# Patient Record
Sex: FEMALE | NOT HISPANIC OR LATINO | URBAN - METROPOLITAN AREA
[De-identification: names, ages, dates, MRNs, and addresses within clinical notes are randomized per-mention and may not be internally consistent; named-entity substitution may affect disease eponyms.]

---

## 2024-10-04 ENCOUNTER — NURSING HOME VISIT (OUTPATIENT)
Dept: POST ACUTE CARE | Facility: EXTERNAL LOCATION | Age: 79
End: 2024-10-04

## 2024-10-04 DIAGNOSIS — I82.4Y2 ACUTE DEEP VEIN THROMBOSIS (DVT) OF PROXIMAL VEIN OF LEFT LOWER EXTREMITY (MULTI): ICD-10-CM

## 2024-10-04 DIAGNOSIS — Z96.642 S/P HIP REPLACEMENT, LEFT: Primary | ICD-10-CM

## 2024-10-04 DIAGNOSIS — D50.9 IRON DEFICIENCY ANEMIA, UNSPECIFIED IRON DEFICIENCY ANEMIA TYPE: ICD-10-CM

## 2024-10-04 DIAGNOSIS — R26.2 DIFFICULTY IN WALKING: ICD-10-CM

## 2024-10-04 DIAGNOSIS — N18.31 STAGE 3A CHRONIC KIDNEY DISEASE (MULTI): ICD-10-CM

## 2024-10-04 NOTE — LETTER
Patient: Leigh Ann Gray  : 1945    Encounter Date: 10/04/2024    [facility]: [facility]  PROGRESS NOTE      Leigh Ann Gray is a 78 y.o. female that is being seen  today for admission for rehab after hospitalization.  .  Subjective  Patient is a 78-year-old female with history of iron deficiency anemia, osteoarthritis of the hip, DVT of the lower extremity, chronic kidney disease, chronic low back pain who is being seen for rehab at nursing facility after hospitalization.  Patient was admitted for surgery for left hip.  Patient had hip replacement done.  Postop patient had DVT in her leg.  Patient has been on anticoagulation.  Patient does have chronic anemia and received iron infusions before surgery.  Patient complains of some dark stools.  Patient will have guaiac stools done for 3 days.  Patient will also have lab work to be done.  Does complain of constipation.  Patient has been started on bowel regimen.  Patient is on MiraLAX as well as on stool softeners.      ROS  Negative for fever or chills  Negative for sore throat, ear pain, nasal discharge  Negative for cough, shortness of breath or wheezing  Negative for chest pain, palpitations, swelling of legs  Negative for abdominal pain, constipation, diarrhea, blood in the stools  Negative for urinary complaints  Negative for headache, dizziness, weakness or numbness  Positive for left hip pain and difficulty in walking.  Negative for depression or anxiety  All other systems reviewed and were negative   Vitals:    10/04/24 1316   BP: 110/70   Pulse: 78   Temp: 36.4 °C (97.6 °F)   SpO2: 96%        Physical Exam  Constitutional: Patient does not appear to be in any acute distress  Head and Face: NCAT  Eyes: Normal external exam, EOMI  ENT: Normal external inspection of ears and nose. Oropharynx normal.  Cardiovascular: RRR, S1/S2, no murmurs, rubs, or gallops, radial pulses +2, no edema of extremities  Pulmonary: CTAB, no respiratory  "distress.  Abdomen: +BS, soft, non-tender, nondistended, no guarding or rebound, no masses noted  MSK: Patient has decreased range of motion of the left hip due to recent surgery.  Mild edema of the left leg..  Skin- No lesions, contusions, or erythema.  Peripheral puslses palpable bilaterally 2+  Neuro: AAO X3, Cranial nerves 2-12 grossly intact,DTR 2+ in all 4 limbs   Psychiatric: Judgment intact. Appropriate mood and behavior    LABS   [unfilled]  Lab Results   Component Value Date    GLUCOSE 95 11/04/2020    CALCIUM 9.6 11/04/2020     11/04/2020    K 3.7 11/04/2020    CO2 20 (L) 11/04/2020     11/04/2020    BUN 20 11/04/2020    CREATININE 1.0 11/04/2020     No results found for: \"ALT\", \"AST\", \"GGT\", \"ALKPHOS\", \"BILITOT\"  Lab Results   Component Value Date    WBC 5.7 10/31/2020    HGB 13.3 10/31/2020    HCT 42.0 10/31/2020    MCV 85.7 10/31/2020     10/31/2020     Lab Results   Component Value Date    CHOL 268 (H) 10/31/2020     Lab Results   Component Value Date    HDL 36 (L) 10/31/2020     Lab Results   Component Value Date    LDLCALC 166 (H) 10/31/2020     Lab Results   Component Value Date    TRIG 331 (H) 10/31/2020     Lab Results   Component Value Date    HGBA1C 5.8 (H) 05/13/2024     Other labs not included in the list above were reviewed either before or during this encounter.    History   No past medical history on file.  Past Surgical History:   Procedure Laterality Date   • MR HEAD ANGIO WO IV CONTRAST  4/23/2015    MR HEAD ANGIO WO IV CONTRAST [facility]   • MR HEAD ANGIO WO IV CONTRAST  10/31/2020    MR HEAD ANGIO WO IV CONTRAST [facility]   • MR NECK ANGIO WO IV CONTRAST  10/31/2020    MR NECK ANGIO WO IV CONTRAST [facility]     No family history on file.  Not on File  No current outpatient medications on file prior to visit.     No current facility-administered medications on file prior to visit.       There is no immunization history on file for " this patient.  Patient's medical history was reviewed and updated either before or during this encounter.  ASSESSMENT / PLAN:  Diagnoses and all orders for this visit:  S/P hip replacement, left  Acute deep vein thrombosis (DVT) of proximal vein of left lower extremity (Multi)  Iron deficiency anemia, unspecified iron deficiency anemia type  Stage 3a chronic kidney disease (Multi)  Difficulty in walking    Patient is being seen for admission for rehab after hospitalization.  Patient had left hip surgery done and needs physical therapy.  Denies any significant pain.  Patient does have iron deficiency anemia.  Patient does complain of black stools.  Patient will have guaiac stools done.  Patient is also on Eliquis for DVT.  Will continue to monitor.    Aaron Pablo MD       Electronically Signed By: Aaron Pablo MD   10/11/24  1:18 PM

## 2024-10-11 ENCOUNTER — NURSING HOME VISIT (OUTPATIENT)
Dept: POST ACUTE CARE | Facility: EXTERNAL LOCATION | Age: 79
End: 2024-10-11
Payer: MEDICARE

## 2024-10-11 VITALS
HEART RATE: 78 BPM | TEMPERATURE: 97.6 F | SYSTOLIC BLOOD PRESSURE: 110 MMHG | OXYGEN SATURATION: 96 % | DIASTOLIC BLOOD PRESSURE: 70 MMHG

## 2024-10-11 DIAGNOSIS — Z98.890 STATUS POST HIP SURGERY: Primary | ICD-10-CM

## 2024-10-11 DIAGNOSIS — I82.4Y2 ACUTE DEEP VEIN THROMBOSIS (DVT) OF PROXIMAL VEIN OF LEFT LOWER EXTREMITY (MULTI): ICD-10-CM

## 2024-10-11 DIAGNOSIS — R26.2 DIFFICULTY IN WALKING: ICD-10-CM

## 2024-10-11 DIAGNOSIS — D50.9 IRON DEFICIENCY ANEMIA, UNSPECIFIED IRON DEFICIENCY ANEMIA TYPE: ICD-10-CM

## 2024-10-11 PROCEDURE — 99310 SBSQ NF CARE HIGH MDM 45: CPT | Performed by: INTERNAL MEDICINE

## 2024-10-11 NOTE — PROGRESS NOTES
[facility]: [facility]  PROGRESS NOTE      Leigh Ann Gray is a 78 y.o. female that is being seen  today for admission for rehab after hospitalization.  .  Subjective   Patient is a 78-year-old female with history of iron deficiency anemia, osteoarthritis of the hip, DVT of the lower extremity, chronic kidney disease, chronic low back pain who is being seen for rehab at nursing facility after hospitalization.  Patient was admitted for surgery for left hip.  Patient had hip replacement done.  Postop patient had DVT in her leg.  Patient has been on anticoagulation.  Patient does have chronic anemia and received iron infusions before surgery.  Patient complains of some dark stools.  Patient will have guaiac stools done for 3 days.  Patient will also have lab work to be done.  Does complain of constipation.  Patient has been started on bowel regimen.  Patient is on MiraLAX as well as on stool softeners.      ROS  Negative for fever or chills  Negative for sore throat, ear pain, nasal discharge  Negative for cough, shortness of breath or wheezing  Negative for chest pain, palpitations, swelling of legs  Negative for abdominal pain, constipation, diarrhea, blood in the stools  Negative for urinary complaints  Negative for headache, dizziness, weakness or numbness  Positive for left hip pain and difficulty in walking.  Negative for depression or anxiety  All other systems reviewed and were negative   Vitals:    10/04/24 1316   BP: 110/70   Pulse: 78   Temp: 36.4 °C (97.6 °F)   SpO2: 96%        Physical Exam  Constitutional: Patient does not appear to be in any acute distress  Head and Face: NCAT  Eyes: Normal external exam, EOMI  ENT: Normal external inspection of ears and nose. Oropharynx normal.  Cardiovascular: RRR, S1/S2, no murmurs, rubs, or gallops, radial pulses +2, no edema of extremities  Pulmonary: CTAB, no respiratory distress.  Abdomen: +BS, soft, non-tender, nondistended, no guarding or  "rebound, no masses noted  MSK: Patient has decreased range of motion of the left hip due to recent surgery.  Mild edema of the left leg..  Skin- No lesions, contusions, or erythema.  Peripheral puslses palpable bilaterally 2+  Neuro: AAO X3, Cranial nerves 2-12 grossly intact,DTR 2+ in all 4 limbs   Psychiatric: Judgment intact. Appropriate mood and behavior    LABS   [unfilled]  Lab Results   Component Value Date    GLUCOSE 95 11/04/2020    CALCIUM 9.6 11/04/2020     11/04/2020    K 3.7 11/04/2020    CO2 20 (L) 11/04/2020     11/04/2020    BUN 20 11/04/2020    CREATININE 1.0 11/04/2020     No results found for: \"ALT\", \"AST\", \"GGT\", \"ALKPHOS\", \"BILITOT\"  Lab Results   Component Value Date    WBC 5.7 10/31/2020    HGB 13.3 10/31/2020    HCT 42.0 10/31/2020    MCV 85.7 10/31/2020     10/31/2020     Lab Results   Component Value Date    CHOL 268 (H) 10/31/2020     Lab Results   Component Value Date    HDL 36 (L) 10/31/2020     Lab Results   Component Value Date    LDLCALC 166 (H) 10/31/2020     Lab Results   Component Value Date    TRIG 331 (H) 10/31/2020     Lab Results   Component Value Date    HGBA1C 5.8 (H) 05/13/2024     Other labs not included in the list above were reviewed either before or during this encounter.    History    No past medical history on file.  Past Surgical History:   Procedure Laterality Date    MR HEAD ANGIO WO IV CONTRAST  4/23/2015    MR HEAD ANGIO WO IV CONTRAST [facility]    MR HEAD ANGIO WO IV CONTRAST  10/31/2020    MR HEAD ANGIO WO IV CONTRAST [facility]    MR NECK ANGIO WO IV CONTRAST  10/31/2020    MR NECK ANGIO WO IV CONTRAST [facility]     No family history on file.  Not on File  No current outpatient medications on file prior to visit.     No current facility-administered medications on file prior to visit.       There is no immunization history on file for this patient.  Patient's medical history was reviewed and updated either " before or during this encounter.  ASSESSMENT / PLAN:  Diagnoses and all orders for this visit:  S/P hip replacement, left  Acute deep vein thrombosis (DVT) of proximal vein of left lower extremity (Multi)  Iron deficiency anemia, unspecified iron deficiency anemia type  Stage 3a chronic kidney disease (Multi)  Difficulty in walking    Patient is being seen for admission for rehab after hospitalization.  Patient had left hip surgery done and needs physical therapy.  Denies any significant pain.  Patient does have iron deficiency anemia.  Patient does complain of black stools.  Patient will have guaiac stools done.  Patient is also on Eliquis for DVT.  Will continue to monitor.    Aaron Pablo MD

## 2024-10-11 NOTE — LETTER
Patient: Leigh Ann Gray  : 1945    Encounter Date: 10/11/2024    [facility]: [facility]  PROGRESS NOTE      Leigh Ann Gray is a 78 y.o. female that is being seen  today for admission for rehab after hospitalization.  .  Subjective  Patient is a 78-year-old female with history of iron deficiency anemia, osteoarthritis of the hip, DVT of the lower extremity, chronic kidney disease, chronic low back pain who is being seen for foll w up at rehab.  Patient was admitted for surgery for left hip.  Patient had hip replacement done.  Postop patient had DVT in her leg.  Patient has been on anticoagulation.  Patient does have chronic anemia and received iron infusions before surgery.  Patient complains of some dark stools.  Patient stool occult was negative.  Patient has been started on iron supplementation and lab work is being done.  Patient is getting physical therapy.  Pain has been fairly controlled.      ROS  Negative for fever or chills  Negative for sore throat, ear pain, nasal discharge  Negative for cough, shortness of breath or wheezing  Negative for chest pain, palpitations, swelling of legs  Negative for abdominal pain, constipation, diarrhea, blood in the stools  Negative for urinary complaints  Negative for headache, dizziness, weakness or numbness  Positive for left hip pain and difficulty in walking.  Negative for depression or anxiety  All other systems reviewed and were negative   Vitals:    10/11/24 1354   BP: 124/72          Physical Exam  Constitutional: Patient does not appear to be in any acute distress  Head and Face: NCAT  Eyes: Normal external exam, EOMI  ENT: Normal external inspection of ears and nose. Oropharynx normal.  Cardiovascular: RRR, S1/S2, no murmurs, rubs, or gallops, radial pulses +2, no edema of extremities  Pulmonary: CTAB, no respiratory distress.  Abdomen: +BS, soft, non-tender, nondistended, no guarding or rebound, no masses noted  MSK: Patient has  "decreased range of motion of the left hip due to recent surgery.  Mild edema of the left leg..  Skin- No lesions, contusions, or erythema.  Peripheral puslses palpable bilaterally 2+  Neuro: AAO X3, Cranial nerves 2-12 grossly intact,DTR 2+ in all 4 limbs   Psychiatric: Judgment intact. Appropriate mood and behavior    LABS   [unfilled]  Lab Results   Component Value Date    GLUCOSE 95 11/04/2020    CALCIUM 9.6 11/04/2020     11/04/2020    K 3.7 11/04/2020    CO2 20 (L) 11/04/2020     11/04/2020    BUN 20 11/04/2020    CREATININE 1.0 11/04/2020     No results found for: \"ALT\", \"AST\", \"GGT\", \"ALKPHOS\", \"BILITOT\"  Lab Results   Component Value Date    WBC 5.7 10/31/2020    HGB 13.3 10/31/2020    HCT 42.0 10/31/2020    MCV 85.7 10/31/2020     10/31/2020     Lab Results   Component Value Date    CHOL 268 (H) 10/31/2020     Lab Results   Component Value Date    HDL 36 (L) 10/31/2020     Lab Results   Component Value Date    LDLCALC 166 (H) 10/31/2020     Lab Results   Component Value Date    TRIG 331 (H) 10/31/2020     Lab Results   Component Value Date    HGBA1C 5.8 (H) 05/13/2024     Other labs not included in the list above were reviewed either before or during this encounter.    History   No past medical history on file.  Past Surgical History:   Procedure Laterality Date   • MR HEAD ANGIO WO IV CONTRAST  4/23/2015    MR HEAD ANGIO WO IV CONTRAST [facility]   • MR HEAD ANGIO WO IV CONTRAST  10/31/2020    MR HEAD ANGIO WO IV CONTRAST [facility]   • MR NECK ANGIO WO IV CONTRAST  10/31/2020    MR NECK ANGIO WO IV CONTRAST [facility]     No family history on file.  Not on File  No current outpatient medications on file prior to visit.     No current facility-administered medications on file prior to visit.       There is no immunization history on file for this patient.  Patient's medical history was reviewed and updated either before or during this encounter.  ASSESSMENT " / PLAN:  Diagnoses and all orders for this visit:  Status post hip surgery  Difficulty in walking  Iron deficiency anemia, unspecified iron deficiency anemia type  Acute deep vein thrombosis (DVT) of proximal vein of left lower extremity (Multi)      Patient is being seen for follow up at rehab .  Patient had left hip surgery done and needs physical therapy.  Denies any significant pain.  Patient does have iron deficiency anemia.  Patient complained of black stools.   guaiac stools negative .  Patient is also on Eliquis for DVT.  Will continue to monitor.    Aaron Pablo MD       Electronically Signed By: Aaron Pablo MD   11/18/24  1:55 PM

## 2024-10-18 ENCOUNTER — NURSING HOME VISIT (OUTPATIENT)
Dept: POST ACUTE CARE | Facility: EXTERNAL LOCATION | Age: 79
End: 2024-10-18
Payer: MEDICARE

## 2024-10-18 DIAGNOSIS — Z98.890 STATUS POST HIP SURGERY: Primary | ICD-10-CM

## 2024-10-18 DIAGNOSIS — R26.2 DIFFICULTY IN WALKING: ICD-10-CM

## 2024-10-18 DIAGNOSIS — D50.9 IRON DEFICIENCY ANEMIA, UNSPECIFIED IRON DEFICIENCY ANEMIA TYPE: ICD-10-CM

## 2024-10-18 DIAGNOSIS — I82.4Y2 ACUTE DEEP VEIN THROMBOSIS (DVT) OF PROXIMAL VEIN OF LEFT LOWER EXTREMITY (MULTI): ICD-10-CM

## 2024-10-18 PROCEDURE — 99309 SBSQ NF CARE MODERATE MDM 30: CPT | Performed by: INTERNAL MEDICINE

## 2024-10-18 NOTE — LETTER
Patient: Leigh Ann Gray  : 1945    Encounter Date: 10/18/2024    [facility]: [facility]  PROGRESS NOTE      Leigh Ann Gray is a 78 y.o. female that is being seen  today for admission for rehab after hospitalization.  .  Subjective  Patient is a 78-year-old female with history of iron deficiency anemia, osteoarthritis of the hip, DVT of the lower extremity, chronic kidney disease, chronic low back pain who is being seen for foll w up at rehab.  Patient was admitted for surgery for left hip.  Patient had hip replacement done.  Postop patient had DVT in her leg.  Patient has been on anticoagulation.  Patient does have chronic anemia and received iron infusions before surgery.   Patient has been on iron supplementation and lab work is being done.  Anemia is stable.Patient is getting physical therapy.  Pain has been fairly controlled.      ROS  Negative for fever or chills  Negative for sore throat, ear pain, nasal discharge  Negative for cough, shortness of breath or wheezing  Negative for chest pain, palpitations, swelling of legs  Negative for abdominal pain, constipation, diarrhea, blood in the stools  Negative for urinary complaints  Negative for headache, dizziness, weakness or numbness  Positive for left hip pain and difficulty in walking.  Negative for depression or anxiety  All other systems reviewed and were negative   Vitals:    10/18/24 1356   BP: 110/74          Physical Exam  Constitutional: Patient does not appear to be in any acute distress  Head and Face: NCAT  Eyes: Normal external exam, EOMI  ENT: Normal external inspection of ears and nose. Oropharynx normal.  Cardiovascular: RRR, S1/S2, no murmurs, rubs, or gallops, radial pulses +2, no edema of extremities  Pulmonary: CTAB, no respiratory distress.  Abdomen: +BS, soft, non-tender, nondistended, no guarding or rebound, no masses noted  MSK: Patient has decreased range of motion of the left hip due to recent surgery.  Mild  "edema of the left leg..  Skin- No lesions, contusions, or erythema.  Peripheral puslses palpable bilaterally 2+  Neuro: AAO X3, Cranial nerves 2-12 grossly intact,DTR 2+ in all 4 limbs   Psychiatric: Judgment intact. Appropriate mood and behavior    LABS   [unfilled]  Lab Results   Component Value Date    GLUCOSE 95 11/04/2020    CALCIUM 9.6 11/04/2020     11/04/2020    K 3.7 11/04/2020    CO2 20 (L) 11/04/2020     11/04/2020    BUN 20 11/04/2020    CREATININE 1.0 11/04/2020     No results found for: \"ALT\", \"AST\", \"GGT\", \"ALKPHOS\", \"BILITOT\"  Lab Results   Component Value Date    WBC 5.7 10/31/2020    HGB 13.3 10/31/2020    HCT 42.0 10/31/2020    MCV 85.7 10/31/2020     10/31/2020     Lab Results   Component Value Date    CHOL 268 (H) 10/31/2020     Lab Results   Component Value Date    HDL 36 (L) 10/31/2020     Lab Results   Component Value Date    LDLCALC 166 (H) 10/31/2020     Lab Results   Component Value Date    TRIG 331 (H) 10/31/2020     Lab Results   Component Value Date    HGBA1C 5.8 (H) 05/13/2024     Other labs not included in the list above were reviewed either before or during this encounter.    History   No past medical history on file.  Past Surgical History:   Procedure Laterality Date   • MR HEAD ANGIO WO IV CONTRAST  4/23/2015    MR HEAD ANGIO WO IV CONTRAST [facility]   • MR HEAD ANGIO WO IV CONTRAST  10/31/2020    MR HEAD ANGIO WO IV CONTRAST [facility]   • MR NECK ANGIO WO IV CONTRAST  10/31/2020    MR NECK ANGIO WO IV CONTRAST [facility]     No family history on file.  Not on File  No current outpatient medications on file prior to visit.     No current facility-administered medications on file prior to visit.       There is no immunization history on file for this patient.  Patient's medical history was reviewed and updated either before or during this encounter.  ASSESSMENT / PLAN:  Diagnoses and all orders for this visit:  Status post hip " surgery  Acute deep vein thrombosis (DVT) of proximal vein of left lower extremity (Multi)  Iron deficiency anemia, unspecified iron deficiency anemia type  Difficulty in walking        Patient is being seen for follow up at rehab .  Patient had left hip surgery done and needs physical therapy.  Denies any significant pain.  Patient does have iron deficiency anemia.  Patient is on iron supplementation and anemia is improving.  Patient is also on Eliquis for DVT.  Will continue to monitor.    Aaron Pablo MD       Electronically Signed By: Aaron Pablo MD   11/18/24  1:58 PM

## 2024-10-25 ENCOUNTER — NURSING HOME VISIT (OUTPATIENT)
Dept: POST ACUTE CARE | Facility: EXTERNAL LOCATION | Age: 79
End: 2024-10-25
Payer: MEDICARE

## 2024-10-25 DIAGNOSIS — I82.4Y2 ACUTE DEEP VEIN THROMBOSIS (DVT) OF PROXIMAL VEIN OF LEFT LOWER EXTREMITY (MULTI): ICD-10-CM

## 2024-10-25 DIAGNOSIS — Z98.890 STATUS POST HIP SURGERY: Primary | ICD-10-CM

## 2024-10-25 DIAGNOSIS — R26.2 DIFFICULTY IN WALKING: ICD-10-CM

## 2024-10-25 DIAGNOSIS — D50.9 IRON DEFICIENCY ANEMIA, UNSPECIFIED IRON DEFICIENCY ANEMIA TYPE: ICD-10-CM

## 2024-10-25 PROCEDURE — 99309 SBSQ NF CARE MODERATE MDM 30: CPT | Performed by: INTERNAL MEDICINE

## 2024-10-25 NOTE — LETTER
Patient: Leigh Ann Gray  : 1945    Encounter Date: 10/25/2024    [facility]: [facility]  PROGRESS NOTE      Leigh Ann Gray is a 78 y.o. female that is being seen  today for admission for rehab after hospitalization.  .  Subjective  Patient is a 78-year-old female with history of iron deficiency anemia, osteoarthritis of the hip, DVT of the lower extremity, chronic kidney disease, chronic low back pain who is being seen for foll w up at rehab.  Patient was admitted for surgery for left hip.  Patient had hip replacement done.  Postop patient had DVT in her leg.  Patient has been on anticoagulation.  Patient does have chronic anemia and received iron infusions before surgery.   Patient has been on iron supplementation and lab work is being done.  Anemia is stable.Patient is getting physical therapy.  Pain has been fairly controlled.      ROS  Negative for fever or chills  Negative for sore throat, ear pain, nasal discharge  Negative for cough, shortness of breath or wheezing  Negative for chest pain, palpitations, swelling of legs  Negative for abdominal pain, constipation, diarrhea, blood in the stools  Negative for urinary complaints  Negative for headache, dizziness, weakness or numbness  Positive for left hip pain and difficulty in walking.  Negative for depression or anxiety  All other systems reviewed and were negative   Vitals:    10/25/24 1359   BP: 124/72            Physical Exam  Constitutional: Patient does not appear to be in any acute distress  Head and Face: NCAT  Eyes: Normal external exam, EOMI  ENT: Normal external inspection of ears and nose. Oropharynx normal.  Cardiovascular: RRR, S1/S2, no murmurs, rubs, or gallops, radial pulses +2, no edema of extremities  Pulmonary: CTAB, no respiratory distress.  Abdomen: +BS, soft, non-tender, nondistended, no guarding or rebound, no masses noted  MSK: Patient has decreased range of motion of the left hip due to recent surgery.   "Mild edema of the left leg..  Skin- No lesions, contusions, or erythema.  Peripheral puslses palpable bilaterally 2+  Neuro: AAO X3, Cranial nerves 2-12 grossly intact,DTR 2+ in all 4 limbs   Psychiatric: Judgment intact. Appropriate mood and behavior    LABS   [unfilled]  Lab Results   Component Value Date    GLUCOSE 95 11/04/2020    CALCIUM 9.6 11/04/2020     11/04/2020    K 3.7 11/04/2020    CO2 20 (L) 11/04/2020     11/04/2020    BUN 20 11/04/2020    CREATININE 1.0 11/04/2020     No results found for: \"ALT\", \"AST\", \"GGT\", \"ALKPHOS\", \"BILITOT\"  Lab Results   Component Value Date    WBC 5.7 10/31/2020    HGB 13.3 10/31/2020    HCT 42.0 10/31/2020    MCV 85.7 10/31/2020     10/31/2020     Lab Results   Component Value Date    CHOL 268 (H) 10/31/2020     Lab Results   Component Value Date    HDL 36 (L) 10/31/2020     Lab Results   Component Value Date    LDLCALC 166 (H) 10/31/2020     Lab Results   Component Value Date    TRIG 331 (H) 10/31/2020     Lab Results   Component Value Date    HGBA1C 5.8 (H) 05/13/2024     Other labs not included in the list above were reviewed either before or during this encounter.    History   No past medical history on file.  Past Surgical History:   Procedure Laterality Date   • MR HEAD ANGIO WO IV CONTRAST  4/23/2015    MR HEAD ANGIO WO IV CONTRAST [facility]PATIENT [facility]   • MR HEAD ANGIO WO IV CONTRAST  10/31/2020    MR HEAD ANGIO WO IV CONTRAST [facility]GENCY [facility]GAC[facility]   • MR NECK ANGIO WO IV CONTRAST  10/31/2020    MR NECK ANGIO WO IV CONTRAST [facility]     No family history on file.  Not on File  No current outpatient medications on file prior to visit.     No current facility-administered medications on file prior to visit.       There is no immunization history on file for this patient.  Patient's medical history was reviewed and updated either before or during this encounter.  ASSESSMENT / PLAN:  Diagnoses and all orders for this visit:  Status post hip " surgery  Iron deficiency anemia, unspecified iron deficiency anemia type  Acute deep vein thrombosis (DVT) of proximal vein of left lower extremity (Multi)  Difficulty in walking          Patient is being seen for follow up at rehab .  Patient had left hip surgery done and needs physical therapy.  Denies any significant pain.  Patient does have iron deficiency anemia.  Patient is on iron supplementation and anemia is improving.  Patient is also on Eliquis for DVT.  Will continue to monitor.    Aaron Pablo MD       Electronically Signed By: Aaron Pablo MD   11/18/24  2:00 PM

## 2024-11-01 ENCOUNTER — NURSING HOME VISIT (OUTPATIENT)
Dept: POST ACUTE CARE | Facility: EXTERNAL LOCATION | Age: 79
End: 2024-11-01
Payer: MEDICARE

## 2024-11-01 DIAGNOSIS — D50.9 IRON DEFICIENCY ANEMIA, UNSPECIFIED IRON DEFICIENCY ANEMIA TYPE: ICD-10-CM

## 2024-11-01 DIAGNOSIS — Z98.890 STATUS POST HIP SURGERY: Primary | ICD-10-CM

## 2024-11-01 DIAGNOSIS — R26.2 DIFFICULTY IN WALKING: ICD-10-CM

## 2024-11-01 DIAGNOSIS — I82.4Y2 ACUTE DEEP VEIN THROMBOSIS (DVT) OF PROXIMAL VEIN OF LEFT LOWER EXTREMITY (MULTI): ICD-10-CM

## 2024-11-01 PROCEDURE — 99309 SBSQ NF CARE MODERATE MDM 30: CPT | Performed by: INTERNAL MEDICINE

## 2024-11-01 NOTE — LETTER
Patient: Leigh Ann Gray  : 1945    Encounter Date: 2024    [facility]: [facility]  PROGRESS NOTE      Leigh Ann Gray is a 78 y.o. female that is being seen  today for admission for rehab after hospitalization.  .  Subjective  Patient is a 78-year-old female with history of iron deficiency anemia, osteoarthritis of the hip, DVT of the lower extremity, chronic kidney disease, chronic low back pain who is being seen for foll w up at rehab.  Patient was admitted for surgery for left hip.  Patient had hip replacement done.  Postop patient had DVT in her leg.  Patient has been on anticoagulation.  Patient does have chronic anemia and received iron infusions before surgery.   Patient has been on iron supplementation and lab work is being done.  Anemia is stable.Patient is getting physical therapy.  Pain has been fairly controlled.      ROS  Negative for fever or chills  Negative for sore throat, ear pain, nasal discharge  Negative for cough, shortness of breath or wheezing  Negative for chest pain, palpitations, swelling of legs  Negative for abdominal pain, constipation, diarrhea, blood in the stools  Negative for urinary complaints  Negative for headache, dizziness, weakness or numbness  Positive for left hip pain and difficulty in walking.  Negative for depression or anxiety  All other systems reviewed and were negative   Vitals:    24 1401   BP: 134/72              Physical Exam  Constitutional: Patient does not appear to be in any acute distress  Head and Face: NCAT  Eyes: Normal external exam, EOMI  ENT: Normal external inspection of ears and nose. Oropharynx normal.  Cardiovascular: RRR, S1/S2, no murmurs, rubs, or gallops, radial pulses +2, no edema of extremities  Pulmonary: CTAB, no respiratory distress.  Abdomen: +BS, soft, non-tender, nondistended, no guarding or rebound, no masses noted  MSK: Patient has decreased range of motion of the left hip due to recent surgery.   "Mild edema of the left leg..  Skin- No lesions, contusions, or erythema.  Peripheral puslses palpable bilaterally 2+  Neuro: AAO X3, Cranial nerves 2-12 grossly intact,DTR 2+ in all 4 limbs   Psychiatric: Judgment intact. Appropriate mood and behavior    LABS   [unfilled]  Lab Results   Component Value Date    GLUCOSE 95 11/04/2020    CALCIUM 9.6 11/04/2020     11/04/2020    K 3.7 11/04/2020    CO2 20 (L) 11/04/2020     11/04/2020    BUN 20 11/04/2020    CREATININE 1.0 11/04/2020     No results found for: \"ALT\", \"AST\", \"GGT\", \"ALKPHOS\", \"BILITOT\"  Lab Results   Component Value Date    WBC 5.7 10/31/2020    HGB 13.3 10/31/2020    HCT 42.0 10/31/2020    MCV 85.7 10/31/2020     10/31/2020     Lab Results   Component Value Date    CHOL 268 (H) 10/31/2020     Lab Results   Component Value Date    HDL 36 (L) 10/31/2020     Lab Results   Component Value Date    LDLCALC 166 (H) 10/31/2020     Lab Results   Component Value Date    TRIG 331 (H) 10/31/2020     Lab Results   Component Value Date    HGBA1C 5.8 (H) 05/13/2024     Other labs not included in the list above were reviewed either before or during this encounter.    History   No past medical history on file.  Past Surgical History:   Procedure Laterality Date   • MR HEAD ANGIO WO IV CONTRAST  4/23/2015    MR HEAD ANGIO WO IV CONTRAST [facility] INPATIENT LEGACY   • MR HEAD ANGIO WO IV CONTRAST  10/31/2020    MR HEAD ANGIO WO IV CONTRAST [facility] EMERGENCY LEGACY   • MR NECK ANGIO WO IV CONTRAST  10/31/2020    MR NECK ANGIO WO IV CONTRAST [facility] EMERGENCY LEGACY     No family history on file.  Not on File  No current outpatient medications on file prior to visit.     No current facility-administered medications on file prior to visit.       There is no immunization history on file for this patient.  Patient's medical history was reviewed and updated either before or during this encounter.  ASSESSMENT / PLAN:  Diagnoses and all orders for this visit:  Status post hip " surgery  Acute deep vein thrombosis (DVT) of proximal vein of left lower extremity (Multi)  Difficulty in walking  Iron deficiency anemia, unspecified iron deficiency anemia type            Patient is being seen for follow up at rehab .  Patient had left hip surgery done and needs physical therapy.  Denies any significant pain.  Patient does have iron deficiency anemia.  Patient is on iron supplementation and anemia is improving.  Patient is also on Eliquis for DVT.  Will continue to monitor.    Aaron Pablo MD       Electronically Signed By: Aaron Pablo MD   11/18/24  2:02 PM

## 2024-11-08 ENCOUNTER — NURSING HOME VISIT (OUTPATIENT)
Dept: POST ACUTE CARE | Facility: EXTERNAL LOCATION | Age: 79
End: 2024-11-08
Payer: MEDICARE

## 2024-11-08 DIAGNOSIS — I82.4Y2 ACUTE DEEP VEIN THROMBOSIS (DVT) OF PROXIMAL VEIN OF LEFT LOWER EXTREMITY (MULTI): ICD-10-CM

## 2024-11-08 DIAGNOSIS — Z98.890 STATUS POST HIP SURGERY: Primary | ICD-10-CM

## 2024-11-08 DIAGNOSIS — D50.9 IRON DEFICIENCY ANEMIA, UNSPECIFIED IRON DEFICIENCY ANEMIA TYPE: ICD-10-CM

## 2024-11-08 DIAGNOSIS — R26.2 DIFFICULTY IN WALKING: ICD-10-CM

## 2024-11-08 PROCEDURE — 99309 SBSQ NF CARE MODERATE MDM 30: CPT | Performed by: INTERNAL MEDICINE

## 2024-11-08 NOTE — LETTER
Patient: Leigh Ann Gray  : 1945    Encounter Date: 2024    [facility]: [facility]  PROGRESS NOTE      Leigh Ann Gray is a 78 y.o. female that is being seen  today for admission for rehab after hospitalization.  .  Subjective  Patient is a 78-year-old female with history of iron deficiency anemia, osteoarthritis of the hip, DVT of the lower extremity, chronic kidney disease, chronic low back pain who is being seen for foll w up at rehab.  Patient was admitted for surgery for left hip.  Patient had hip replacement done.  Postop patient had DVT in her leg.  Patient has been on anticoagulation.  Patient does have chronic anemia and received iron infusions before surgery.   Patient has been on iron supplementation and lab work is being done.  Anemia is stable.Patient is getting physical therapy.  Pain has been fairly controlled.      ROS  Negative for fever or chills  Negative for sore throat, ear pain, nasal discharge  Negative for cough, shortness of breath or wheezing  Negative for chest pain, palpitations, swelling of legs  Negative for abdominal pain, constipation, diarrhea, blood in the stools  Negative for urinary complaints  Negative for headache, dizziness, weakness or numbness  Positive for left hip pain and difficulty in walking.  Negative for depression or anxiety  All other systems reviewed and were negative   Vitals:    24 1402   BP: 124/68                Physical Exam  Constitutional: Patient does not appear to be in any acute distress  Head and Face: NCAT  Eyes: Normal external exam, EOMI  ENT: Normal external inspection of ears and nose. Oropharynx normal.  Cardiovascular: RRR, S1/S2, no murmurs, rubs, or gallops, radial pulses +2, no edema of extremities  Pulmonary: CTAB, no respiratory distress.  Abdomen: +BS, soft, non-tender, nondistended, no guarding or rebound, no masses noted  MSK: Patient has decreased range of motion of the left hip due to recent surgery.   "Mild edema of the left leg..  Skin- No lesions, contusions, or erythema.  Peripheral puslses palpable bilaterally 2+  Neuro: AAO X3, Cranial nerves 2-12 grossly intact,DTR 2+ in all 4 limbs   Psychiatric: Judgment intact. Appropriate mood and behavior    LABS   [unfilled]  Lab Results   Component Value Date    GLUCOSE 95 11/04/2020    CALCIUM 9.6 11/04/2020     11/04/2020    K 3.7 11/04/2020    CO2 20 (L) 11/04/2020     11/04/2020    BUN 20 11/04/2020    CREATININE 1.0 11/04/2020     No results found for: \"ALT\", \"AST\", \"GGT\", \"ALKPHOS\", \"BILITOT\"  Lab Results   Component Value Date    WBC 5.7 10/31/2020    HGB 13.3 10/31/2020    HCT 42.0 10/31/2020    MCV 85.7 10/31/2020     10/31/2020     Lab Results   Component Value Date    CHOL 268 (H) 10/31/2020     Lab Results   Component Value Date    HDL 36 (L) 10/31/2020     Lab Results   Component Value Date    LDLCALC 166 (H) 10/31/2020     Lab Results   Component Value Date    TRIG 331 (H) 10/31/2020     Lab Results   Component Value Date    HGBA1C 5.8 (H) 05/13/2024     Other labs not included in the list above were reviewed either before or during this encounter.    History   No past medical history on file.  Past Surgical History:   Procedure Laterality Date   • MR HEAD ANGIO WO IV CONTRAST  4/23/2015    MR HEAD ANGIO WO IV CONTRAST [facility] INPATIENT LEGACY   • MR HEAD ANGIO WO IV CONTRAST  10/31/2020    MR HEAD ANGIO WO IV CONTRAST [facility] EMERGENCY LEGACY   • MR NECK ANGIO WO IV CONTRAST  10/31/2020    MR NECK ANGIO WO IV CONTRAST [facility] EMERGENCY LEGACY     No family history on file.  Not on File  No current outpatient medications on file prior to visit.     No current facility-administered medications on file prior to visit.       There is no immunization history on file for this patient.  Patient's medical history was reviewed and updated either before or during this encounter.  ASSESSMENT / PLAN:  Diagnoses and all orders for this visit:  Status post hip " surgery  Acute deep vein thrombosis (DVT) of proximal vein of left lower extremity (Multi)  Iron deficiency anemia, unspecified iron deficiency anemia type  Difficulty in walking              Patient is being seen for follow up at rehab .  Patient had left hip surgery done and needs physical therapy.  Denies any significant pain.  Patient does have iron deficiency anemia.  Patient is on iron supplementation and anemia is improving.  Patient is also on Eliquis for DVT.  Will continue to monitor.    Aaron Pablo MD       Electronically Signed By: Aaron Pablo MD   11/18/24  2:03 PM

## 2024-11-15 ENCOUNTER — NURSING HOME VISIT (OUTPATIENT)
Dept: POST ACUTE CARE | Facility: EXTERNAL LOCATION | Age: 79
End: 2024-11-15
Payer: MEDICARE

## 2024-11-15 DIAGNOSIS — I82.4Y2 ACUTE DEEP VEIN THROMBOSIS (DVT) OF PROXIMAL VEIN OF LEFT LOWER EXTREMITY (MULTI): ICD-10-CM

## 2024-11-15 DIAGNOSIS — Z98.890 STATUS POST HIP SURGERY: Primary | ICD-10-CM

## 2024-11-15 DIAGNOSIS — R26.2 DIFFICULTY IN WALKING: ICD-10-CM

## 2024-11-15 DIAGNOSIS — D50.9 IRON DEFICIENCY ANEMIA, UNSPECIFIED IRON DEFICIENCY ANEMIA TYPE: ICD-10-CM

## 2024-11-15 PROCEDURE — 99309 SBSQ NF CARE MODERATE MDM 30: CPT | Performed by: INTERNAL MEDICINE

## 2024-11-15 NOTE — LETTER
Patient: Leigh Ann Gray  : 1945    Encounter Date: 11/15/2024    [facility]: [facility]  PROGRESS NOTE      Leigh Ann Gray is a 78 y.o. female that is being seen  today for admission for rehab after hospitalization.  .  Subjective  Patient is a 78-year-old female with history of iron deficiency anemia, osteoarthritis of the hip, DVT of the lower extremity, chronic kidney disease, chronic low back pain who is being seen for foll w up at rehab.  Patient was admitted for surgery for left hip.  Patient had hip replacement done.  Postop patient had DVT in her leg.  Patient has been on anticoagulation.  Patient does have chronic anemia and received iron infusions before surgery.   Patient has been on iron supplementation and lab work is being done.  Anemia is stable.Patient is getting physical therapy.  Pain has been fairly controlled.      ROS  Negative for fever or chills  Negative for sore throat, ear pain, nasal discharge  Negative for cough, shortness of breath or wheezing  Negative for chest pain, palpitations, swelling of legs  Negative for abdominal pain, constipation, diarrhea, blood in the stools  Negative for urinary complaints  Negative for headache, dizziness, weakness or numbness  Positive for left hip pain and difficulty in walking.  Negative for depression or anxiety  All other systems reviewed and were negative   There were no vitals filed for this visit.               Physical Exam  Constitutional: Patient does not appear to be in any acute distress  Head and Face: NCAT  Eyes: Normal external exam, EOMI  ENT: Normal external inspection of ears and nose. Oropharynx normal.  Cardiovascular: RRR, S1/S2, no murmurs, rubs, or gallops, radial pulses +2, no edema of extremities  Pulmonary: CTAB, no respiratory distress.  Abdomen: +BS, soft, non-tender, nondistended, no guarding or rebound, no masses noted  MSK: Patient has decreased range of motion of the left hip due to recent  "surgery.  Mild edema of the left leg..  Skin- No lesions, contusions, or erythema.  Peripheral puslses palpable bilaterally 2+  Neuro: AAO X3, Cranial nerves 2-12 grossly intact,DTR 2+ in all 4 limbs   Psychiatric: Judgment intact. Appropriate mood and behavior    LABS   [unfilled]  Lab Results   Component Value Date    GLUCOSE 95 11/04/2020    CALCIUM 9.6 11/04/2020     11/04/2020    K 3.7 11/04/2020    CO2 20 (L) 11/04/2020     11/04/2020    BUN 20 11/04/2020    CREATININE 1.0 11/04/2020     No results found for: \"ALT\", \"AST\", \"GGT\", \"ALKPHOS\", \"BILITOT\"  Lab Results   Component Value Date    WBC 5.7 10/31/2020    HGB 13.3 10/31/2020    HCT 42.0 10/31/2020    MCV 85.7 10/31/2020     10/31/2020     Lab Results   Component Value Date    CHOL 268 (H) 10/31/2020     Lab Results   Component Value Date    HDL 36 (L) 10/31/2020     Lab Results   Component Value Date    LDLCALC 166 (H) 10/31/2020     Lab Results   Component Value Date    TRIG 331 (H) 10/31/2020     Lab Results   Component Value Date    HGBA1C 5.8 (H) 05/13/2024     Other labs not included in the list above were reviewed either before or during this encounter.    History   No past medical history on file.  Past Surgical History:   Procedure Laterality Date   • MR HEAD ANGIO WO IV CONTRAST  4/23/2015    MR HEAD ANGIO WO IV CONTRAST [facility] INPATIENT LEGACY   • MR HEAD ANGIO WO IV CONTRAST  10/31/2020    MR HEAD ANGIO WO IV CONTRAST [facility] EMERGENCY LEGACY   • MR NECK ANGIO WO IV CONTRAST  10/31/2020    MR NECK ANGIO WO IV CONTRAST [facility] EMERGENCY LEGACY     No family history on file.  Not on File  No current outpatient medications on file prior to visit.     No current facility-administered medications on file prior to visit.       There is no immunization history on file for this patient.  Patient's medical history was reviewed and updated either before or during this encounter.  ASSESSMENT / PLAN:  Diagnoses and all orders for this " visit:  Status post hip surgery  Acute deep vein thrombosis (DVT) of proximal vein of left lower extremity (Multi)  Difficulty in walking  Iron deficiency anemia, unspecified iron deficiency anemia type                Patient is being seen for follow up at rehab .  Patient had left hip surgery done and needs physical therapy.  Denies any significant pain.  Patient does have iron deficiency anemia.  Patient is on iron supplementation and anemia is improving.  Patient is also on Eliquis for DVT.  Will continue to monitor.    Aaron Pablo MD       Electronically Signed By: Aaron Pablo MD   11/18/24  2:04 PM

## 2024-11-18 VITALS — DIASTOLIC BLOOD PRESSURE: 74 MMHG | SYSTOLIC BLOOD PRESSURE: 110 MMHG

## 2024-11-18 VITALS — DIASTOLIC BLOOD PRESSURE: 72 MMHG | SYSTOLIC BLOOD PRESSURE: 134 MMHG

## 2024-11-18 VITALS — DIASTOLIC BLOOD PRESSURE: 72 MMHG | SYSTOLIC BLOOD PRESSURE: 124 MMHG

## 2024-11-18 VITALS — SYSTOLIC BLOOD PRESSURE: 124 MMHG | DIASTOLIC BLOOD PRESSURE: 68 MMHG

## 2024-11-18 VITALS — SYSTOLIC BLOOD PRESSURE: 124 MMHG | DIASTOLIC BLOOD PRESSURE: 72 MMHG

## 2024-11-18 NOTE — PROGRESS NOTES
[facility]: [facility]  PROGRESS NOTE      Leigh Ann Gray is a 78 y.o. female that is being seen  today for admission for rehab after hospitalization.  .  Subjective   Patient is a 78-year-old female with history of iron deficiency anemia, osteoarthritis of the hip, DVT of the lower extremity, chronic kidney disease, chronic low back pain who is being seen for foll w up at rehab.  Patient was admitted for surgery for left hip.  Patient had hip replacement done.  Postop patient had DVT in her leg.  Patient has been on anticoagulation.  Patient does have chronic anemia and received iron infusions before surgery.   Patient has been on iron supplementation and lab work is being done.  Anemia is stable.Patient is getting physical therapy.  Pain has been fairly controlled.      ROS  Negative for fever or chills  Negative for sore throat, ear pain, nasal discharge  Negative for cough, shortness of breath or wheezing  Negative for chest pain, palpitations, swelling of legs  Negative for abdominal pain, constipation, diarrhea, blood in the stools  Negative for urinary complaints  Negative for headache, dizziness, weakness or numbness  Positive for left hip pain and difficulty in walking.  Negative for depression or anxiety  All other systems reviewed and were negative   Vitals:    10/18/24 1356   BP: 110/74          Physical Exam  Constitutional: Patient does not appear to be in any acute distress  Head and Face: NCAT  Eyes: Normal external exam, EOMI  ENT: Normal external inspection of ears and nose. Oropharynx normal.  Cardiovascular: RRR, S1/S2, no murmurs, rubs, or gallops, radial pulses +2, no edema of extremities  Pulmonary: CTAB, no respiratory distress.  Abdomen: +BS, soft, non-tender, nondistended, no guarding or rebound, no masses noted  MSK: Patient has decreased range of motion of the left hip due to recent surgery.  Mild edema of the left leg..  Skin- No lesions, contusions, or  "erythema.  Peripheral puslses palpable bilaterally 2+  Neuro: AAO X3, Cranial nerves 2-12 grossly intact,DTR 2+ in all 4 limbs   Psychiatric: Judgment intact. Appropriate mood and behavior    LABS   [unfilled]  Lab Results   Component Value Date    GLUCOSE 95 11/04/2020    CALCIUM 9.6 11/04/2020     11/04/2020    K 3.7 11/04/2020    CO2 20 (L) 11/04/2020     11/04/2020    BUN 20 11/04/2020    CREATININE 1.0 11/04/2020     No results found for: \"ALT\", \"AST\", \"GGT\", \"ALKPHOS\", \"BILITOT\"  Lab Results   Component Value Date    WBC 5.7 10/31/2020    HGB 13.3 10/31/2020    HCT 42.0 10/31/2020    MCV 85.7 10/31/2020     10/31/2020     Lab Results   Component Value Date    CHOL 268 (H) 10/31/2020     Lab Results   Component Value Date    HDL 36 (L) 10/31/2020     Lab Results   Component Value Date    LDLCALC 166 (H) 10/31/2020     Lab Results   Component Value Date    TRIG 331 (H) 10/31/2020     Lab Results   Component Value Date    HGBA1C 5.8 (H) 05/13/2024     Other labs not included in the list above were reviewed either before or during this encounter.    History    No past medical history on file.  Past Surgical History:   Procedure Laterality Date    MR HEAD ANGIO WO IV CONTRAST  4/23/2015    MR HEAD ANGIO WO IV CONTRAST [facility] INPATIENT LEGACY    MR HEAD ANGIO WO IV CONTRAST  10/31/2020    MR HEAD ANGIO WO IV CONTRAST [facility]    MR NECK ANGIO WO IV CONTRAST  10/31/2020    MR NECK ANGIO WO IV CONTRAST [facility] EMERGENCY LEGACY     No family history on file.  Not on File  No current outpatient medications on file prior to visit.     No current facility-administered medications on file prior to visit.       There is no immunization history on file for this patient.  Patient's medical history was reviewed and updated either before or during this encounter.  ASSESSMENT / PLAN:  Diagnoses and all orders for this visit:  Status post hip surgery  Acute deep vein thrombosis (DVT) of proximal vein of " left lower extremity (Multi)  Iron deficiency anemia, unspecified iron deficiency anemia type  Difficulty in walking        Patient is being seen for follow up at rehab .  Patient had left hip surgery done and needs physical therapy.  Denies any significant pain.  Patient does have iron deficiency anemia.  Patient is on iron supplementation and anemia is improving.  Patient is also on Eliquis for DVT.  Will continue to monitor.    Aaron Pablo MD

## 2024-11-18 NOTE — PROGRESS NOTES
[facility]: [facility]  PROGRESS NOTE      Leigh Ann Gray is a 78 y.o. female that is being seen  today for admission for rehab after hospitalization.  .  Subjective   Patient is a 78-year-old female with history of iron deficiency anemia, osteoarthritis of the hip, DVT of the lower extremity, chronic kidney disease, chronic low back pain who is being seen for foll w up at rehab.  Patient was admitted for surgery for left hip.  Patient had hip replacement done.  Postop patient had DVT in her leg.  Patient has been on anticoagulation.  Patient does have chronic anemia and received iron infusions before surgery.   Patient has been on iron supplementation and lab work is being done.  Anemia is stable.Patient is getting physical therapy.  Pain has been fairly controlled.      ROS  Negative for fever or chills  Negative for sore throat, ear pain, nasal discharge  Negative for cough, shortness of breath or wheezing  Negative for chest pain, palpitations, swelling of legs  Negative for abdominal pain, constipation, diarrhea, blood in the stools  Negative for urinary complaints  Negative for headache, dizziness, weakness or numbness  Positive for left hip pain and difficulty in walking.  Negative for depression or anxiety  All other systems reviewed and were negative   Vitals:    11/08/24 1402   BP: 124/68                Physical Exam  Constitutional: Patient does not appear to be in any acute distress  Head and Face: NCAT  Eyes: Normal external exam, EOMI  ENT: Normal external inspection of ears and nose. Oropharynx normal.  Cardiovascular: RRR, S1/S2, no murmurs, rubs, or gallops, radial pulses +2, no edema of extremities  Pulmonary: CTAB, no respiratory distress.  Abdomen: +BS, soft, non-tender, nondistended, no guarding or rebound, no masses noted  MSK: Patient has decreased range of motion of the left hip due to recent surgery.  Mild edema of the left leg..  Skin- No lesions, contusions, or  "erythema.  Peripheral puslses palpable bilaterally 2+  Neuro: AAO X3, Cranial nerves 2-12 grossly intact,DTR 2+ in all 4 limbs   Psychiatric: Judgment intact. Appropriate mood and behavior    LABS   [unfilled]  Lab Results   Component Value Date    GLUCOSE 95 11/04/2020    CALCIUM 9.6 11/04/2020     11/04/2020    K 3.7 11/04/2020    CO2 20 (L) 11/04/2020     11/04/2020    BUN 20 11/04/2020    CREATININE 1.0 11/04/2020     No results found for: \"ALT\", \"AST\", \"GGT\", \"ALKPHOS\", \"BILITOT\"  Lab Results   Component Value Date    WBC 5.7 10/31/2020    HGB 13.3 10/31/2020    HCT 42.0 10/31/2020    MCV 85.7 10/31/2020     10/31/2020     Lab Results   Component Value Date    CHOL 268 (H) 10/31/2020     Lab Results   Component Value Date    HDL 36 (L) 10/31/2020     Lab Results   Component Value Date    LDLCALC 166 (H) 10/31/2020     Lab Results   Component Value Date    TRIG 331 (H) 10/31/2020     Lab Results   Component Value Date    HGBA1C 5.8 (H) 05/13/2024     Other labs not included in the list above were reviewed either before or during this encounter.    History    No past medical history on file.  Past Surgical History:   Procedure Laterality Date    MR HEAD ANGIO WO IV CONTRAST  4/23/2015    MR HEAD ANGIO WO IV CONTRAST [facility] INPATIENT LEGACY    MR HEAD ANGIO WO IV CONTRAST  10/31/2020    MR HEAD ANGIO WO IV CONTRAST [facility]Y [facility]    MR NECK ANGIO WO IV CONTRAST  10/31/2020    MR NECK ANGIO WO IV CONTRAST [facility] EMERGENCY LEGACY     No family history on file.  Not on File  No current outpatient medications on file prior to visit.     No current facility-administered medications on file prior to visit.       There is no immunization history on file for this patient.  Patient's medical history was reviewed and updated either before or during this encounter.  ASSESSMENT / PLAN:  Diagnoses and all orders for this visit:  Status post hip surgery  Acute deep vein thrombosis (DVT) of proximal vein of " left lower extremity (Multi)  Iron deficiency anemia, unspecified iron deficiency anemia type  Difficulty in walking              Patient is being seen for follow up at rehab .  Patient had left hip surgery done and needs physical therapy.  Denies any significant pain.  Patient does have iron deficiency anemia.  Patient is on iron supplementation and anemia is improving.  Patient is also on Eliquis for DVT.  Will continue to monitor.    Aaron Pablo MD

## 2024-11-18 NOTE — PROGRESS NOTES
[facility]: [facility]  PROGRESS NOTE      Leigh Ann Gray is a 78 y.o. female that is being seen  today for admission for rehab after hospitalization.  .  Subjective   Patient is a 78-year-old female with history of iron deficiency anemia, osteoarthritis of the hip, DVT of the lower extremity, chronic kidney disease, chronic low back pain who is being seen for foll w up at rehab.  Patient was admitted for surgery for left hip.  Patient had hip replacement done.  Postop patient had DVT in her leg.  Patient has been on anticoagulation.  Patient does have chronic anemia and received iron infusions before surgery.   Patient has been on iron supplementation and lab work is being done.  Anemia is stable.Patient is getting physical therapy.  Pain has been fairly controlled.      ROS  Negative for fever or chills  Negative for sore throat, ear pain, nasal discharge  Negative for cough, shortness of breath or wheezing  Negative for chest pain, palpitations, swelling of legs  Negative for abdominal pain, constipation, diarrhea, blood in the stools  Negative for urinary complaints  Negative for headache, dizziness, weakness or numbness  Positive for left hip pain and difficulty in walking.  Negative for depression or anxiety  All other systems reviewed and were negative   There were no vitals filed for this visit.               Physical Exam  Constitutional: Patient does not appear to be in any acute distress  Head and Face: NCAT  Eyes: Normal external exam, EOMI  ENT: Normal external inspection of ears and nose. Oropharynx normal.  Cardiovascular: RRR, S1/S2, no murmurs, rubs, or gallops, radial pulses +2, no edema of extremities  Pulmonary: CTAB, no respiratory distress.  Abdomen: +BS, soft, non-tender, nondistended, no guarding or rebound, no masses noted  MSK: Patient has decreased range of motion of the left hip due to recent surgery.  Mild edema of the left leg..  Skin- No lesions, contusions, or  "erythema.  Peripheral puslses palpable bilaterally 2+  Neuro: AAO X3, Cranial nerves 2-12 grossly intact,DTR 2+ in all 4 limbs   Psychiatric: Judgment intact. Appropriate mood and behavior    LABS   [unfilled]  Lab Results   Component Value Date    GLUCOSE 95 11/04/2020    CALCIUM 9.6 11/04/2020     11/04/2020    K 3.7 11/04/2020    CO2 20 (L) 11/04/2020     11/04/2020    BUN 20 11/04/2020    CREATININE 1.0 11/04/2020     No results found for: \"ALT\", \"AST\", \"GGT\", \"ALKPHOS\", \"BILITOT\"  Lab Results   Component Value Date    WBC 5.7 10/31/2020    HGB 13.3 10/31/2020    HCT 42.0 10/31/2020    MCV 85.7 10/31/2020     10/31/2020     Lab Results   Component Value Date    CHOL 268 (H) 10/31/2020     Lab Results   Component Value Date    HDL 36 (L) 10/31/2020     Lab Results   Component Value Date    LDLCALC 166 (H) 10/31/2020     Lab Results   Component Value Date    TRIG 331 (H) 10/31/2020     Lab Results   Component Value Date    HGBA1C 5.8 (H) 05/13/2024     Other labs not included in the list above were reviewed either before or during this encounter.    History    No past medical history on file.  Past Surgical History:   Procedure Laterality Date    MR HEAD ANGIO WO IV CONTRAST  4/23/2015    MR HEAD ANGIO WO IV CONTRAST [facility] INPATIENT LEGACY    MR HEAD ANGIO WO IV CONTRAST  10/31/2020    MR HEAD ANGIO WO IV CONTRAST [facility]    MR NECK ANGIO WO IV CONTRAST  10/31/2020    MR NECK ANGIO WO IV CONTRAST [facility] EMERGENCY LEGACY     No family history on file.  Not on File  No current outpatient medications on file prior to visit.     No current facility-administered medications on file prior to visit.       There is no immunization history on file for this patient.  Patient's medical history was reviewed and updated either before or during this encounter.  ASSESSMENT / PLAN:  Diagnoses and all orders for this visit:  Status post hip surgery  Acute deep vein thrombosis (DVT) of proximal vein of " left lower extremity (Multi)  Difficulty in walking  Iron deficiency anemia, unspecified iron deficiency anemia type                Patient is being seen for follow up at rehab .  Patient had left hip surgery done and needs physical therapy.  Denies any significant pain.  Patient does have iron deficiency anemia.  Patient is on iron supplementation and anemia is improving.  Patient is also on Eliquis for DVT.  Will continue to monitor.    Aaron Pablo MD

## 2024-11-18 NOTE — PROGRESS NOTES
[facility]: [facility]  PROGRESS NOTE      Leigh Ann Gray is a 78 y.o. female that is being seen  today for admission for rehab after hospitalization.  .  Subjective   Patient is a 78-year-old female with history of iron deficiency anemia, osteoarthritis of the hip, DVT of the lower extremity, chronic kidney disease, chronic low back pain who is being seen for foll w up at rehab.  Patient was admitted for surgery for left hip.  Patient had hip replacement done.  Postop patient had DVT in her leg.  Patient has been on anticoagulation.  Patient does have chronic anemia and received iron infusions before surgery.  Patient complains of some dark stools.  Patient stool occult was negative.  Patient has been started on iron supplementation and lab work is being done.  Patient is getting physical therapy.  Pain has been fairly controlled.      ROS  Negative for fever or chills  Negative for sore throat, ear pain, nasal discharge  Negative for cough, shortness of breath or wheezing  Negative for chest pain, palpitations, swelling of legs  Negative for abdominal pain, constipation, diarrhea, blood in the stools  Negative for urinary complaints  Negative for headache, dizziness, weakness or numbness  Positive for left hip pain and difficulty in walking.  Negative for depression or anxiety  All other systems reviewed and were negative   Vitals:    10/11/24 1354   BP: 124/72          Physical Exam  Constitutional: Patient does not appear to be in any acute distress  Head and Face: NCAT  Eyes: Normal external exam, EOMI  ENT: Normal external inspection of ears and nose. Oropharynx normal.  Cardiovascular: RRR, S1/S2, no murmurs, rubs, or gallops, radial pulses +2, no edema of extremities  Pulmonary: CTAB, no respiratory distress.  Abdomen: +BS, soft, non-tender, nondistended, no guarding or rebound, no masses noted  MSK: Patient has decreased range of motion of the left hip due to recent surgery.  Mild  "edema of the left leg..  Skin- No lesions, contusions, or erythema.  Peripheral puslses palpable bilaterally 2+  Neuro: AAO X3, Cranial nerves 2-12 grossly intact,DTR 2+ in all 4 limbs   Psychiatric: Judgment intact. Appropriate mood and behavior    LABS   [unfilled]  Lab Results   Component Value Date    GLUCOSE 95 11/04/2020    CALCIUM 9.6 11/04/2020     11/04/2020    K 3.7 11/04/2020    CO2 20 (L) 11/04/2020     11/04/2020    BUN 20 11/04/2020    CREATININE 1.0 11/04/2020     No results found for: \"ALT\", \"AST\", \"GGT\", \"ALKPHOS\", \"BILITOT\"  Lab Results   Component Value Date    WBC 5.7 10/31/2020    HGB 13.3 10/31/2020    HCT 42.0 10/31/2020    MCV 85.7 10/31/2020     10/31/2020     Lab Results   Component Value Date    CHOL 268 (H) 10/31/2020     Lab Results   Component Value Date    HDL 36 (L) 10/31/2020     Lab Results   Component Value Date    LDLCALC 166 (H) 10/31/2020     Lab Results   Component Value Date    TRIG 331 (H) 10/31/2020     Lab Results   Component Value Date    HGBA1C 5.8 (H) 05/13/2024     Other labs not included in the list above were reviewed either before or during this encounter.    History    No past medical history on file.  Past Surgical History:   Procedure Laterality Date    MR HEAD ANGIO WO IV CONTRAST  4/23/2015    MR HEAD ANGIO WO IV CONTRAST [facility]    MR HEAD ANGIO WO IV CONTRAST  10/31/2020    MR HEAD ANGIO WO IV CONTRAST [facility]    MR NECK ANGIO WO IV CONTRAST  10/31/2020    MR NECK ANGIO WO IV CONTRAST [facility]     No family history on file.  Not on File  No current outpatient medications on file prior to visit.     No current facility-administered medications on file prior to visit.       There is no immunization history on file for this patient.  Patient's medical history was reviewed and updated either before or during this encounter.  ASSESSMENT / PLAN:  Diagnoses and all orders for this visit:  Status post hip " surgery  Difficulty in walking  Iron deficiency anemia, unspecified iron deficiency anemia type  Acute deep vein thrombosis (DVT) of proximal vein of left lower extremity (Multi)      Patient is being seen for follow up at rehab .  Patient had left hip surgery done and needs physical therapy.  Denies any significant pain.  Patient does have iron deficiency anemia.  Patient complained of black stools.   guaiac stools negative .  Patient is also on Eliquis for DVT.  Will continue to monitor.    Aaron Pablo MD

## 2024-11-18 NOTE — PROGRESS NOTES
[facility]: [facility]  PROGRESS NOTE      Leigh Ann Gray is a 78 y.o. female that is being seen  today for admission for rehab after hospitalization.  .  Subjective   Patient is a 78-year-old female with history of iron deficiency anemia, osteoarthritis of the hip, DVT of the lower extremity, chronic kidney disease, chronic low back pain who is being seen for foll w up at rehab.  Patient was admitted for surgery for left hip.  Patient had hip replacement done.  Postop patient had DVT in her leg.  Patient has been on anticoagulation.  Patient does have chronic anemia and received iron infusions before surgery.   Patient has been on iron supplementation and lab work is being done.  Anemia is stable.Patient is getting physical therapy.  Pain has been fairly controlled.      ROS  Negative for fever or chills  Negative for sore throat, ear pain, nasal discharge  Negative for cough, shortness of breath or wheezing  Negative for chest pain, palpitations, swelling of legs  Negative for abdominal pain, constipation, diarrhea, blood in the stools  Negative for urinary complaints  Negative for headache, dizziness, weakness or numbness  Positive for left hip pain and difficulty in walking.  Negative for depression or anxiety  All other systems reviewed and were negative   Vitals:    11/01/24 1401   BP: 134/72              Physical Exam  Constitutional: Patient does not appear to be in any acute distress  Head and Face: NCAT  Eyes: Normal external exam, EOMI  ENT: Normal external inspection of ears and nose. Oropharynx normal.  Cardiovascular: RRR, S1/S2, no murmurs, rubs, or gallops, radial pulses +2, no edema of extremities  Pulmonary: CTAB, no respiratory distress.  Abdomen: +BS, soft, non-tender, nondistended, no guarding or rebound, no masses noted  MSK: Patient has decreased range of motion of the left hip due to recent surgery.  Mild edema of the left leg..  Skin- No lesions, contusions, or  "erythema.  Peripheral puslses palpable bilaterally 2+  Neuro: AAO X3, Cranial nerves 2-12 grossly intact,DTR 2+ in all 4 limbs   Psychiatric: Judgment intact. Appropriate mood and behavior    LABS   [unfilled]  Lab Results   Component Value Date    GLUCOSE 95 11/04/2020    CALCIUM 9.6 11/04/2020     11/04/2020    K 3.7 11/04/2020    CO2 20 (L) 11/04/2020     11/04/2020    BUN 20 11/04/2020    CREATININE 1.0 11/04/2020     No results found for: \"ALT\", \"AST\", \"GGT\", \"ALKPHOS\", \"BILITOT\"  Lab Results   Component Value Date    WBC 5.7 10/31/2020    HGB 13.3 10/31/2020    HCT 42.0 10/31/2020    MCV 85.7 10/31/2020     10/31/2020     Lab Results   Component Value Date    CHOL 268 (H) 10/31/2020     Lab Results   Component Value Date    HDL 36 (L) 10/31/2020     Lab Results   Component Value Date    LDLCALC 166 (H) 10/31/2020     Lab Results   Component Value Date    TRIG 331 (H) 10/31/2020     Lab Results   Component Value Date    HGBA1C 5.8 (H) 05/13/2024     Other labs not included in the list above were reviewed either before or during this encounter.    History    No past medical history on file.  Past Surgical History:   Procedure Laterality Date    MR HEAD ANGIO WO IV CONTRAST  4/23/2015    MR HEAD ANGIO WO IV CONTRAST [facility] INPATIENT LEGACY    MR HEAD ANGIO WO IV CONTRAST  10/31/2020    MR HEAD ANGIO WO IV CONTRAST [facility]    MR NECK ANGIO WO IV CONTRAST  10/31/2020    MR NECK ANGIO WO IV CONTRAST [facility] EM[facility]GENCY LEGAC[facility]     No family history on file.  Not on File  No current outpatient medications on file prior to visit.     No current facility-administered medications on file prior to visit.       There is no immunization history on file for this patient.  Patient's medical history was reviewed and updated either before or during this encounter.  ASSESSMENT / PLAN:  Diagnoses and all orders for this visit:  Status post hip surgery  Acute deep vein thrombosis (DVT) of proximal vein of " left lower extremity (Multi)  Difficulty in walking  Iron deficiency anemia, unspecified iron deficiency anemia type            Patient is being seen for follow up at rehab .  Patient had left hip surgery done and needs physical therapy.  Denies any significant pain.  Patient does have iron deficiency anemia.  Patient is on iron supplementation and anemia is improving.  Patient is also on Eliquis for DVT.  Will continue to monitor.    Aaron Pablo MD

## 2024-11-18 NOTE — PROGRESS NOTES
[facility]: [facility]  PROGRESS NOTE      Leigh Ann Gray is a 78 y.o. female that is being seen  today for admission for rehab after hospitalization.  .  Subjective   Patient is a 78-year-old female with history of iron deficiency anemia, osteoarthritis of the hip, DVT of the lower extremity, chronic kidney disease, chronic low back pain who is being seen for foll w up at rehab.  Patient was admitted for surgery for left hip.  Patient had hip replacement done.  Postop patient had DVT in her leg.  Patient has been on anticoagulation.  Patient does have chronic anemia and received iron infusions before surgery.   Patient has been on iron supplementation and lab work is being done.  Anemia is stable.Patient is getting physical therapy.  Pain has been fairly controlled.      ROS  Negative for fever or chills  Negative for sore throat, ear pain, nasal discharge  Negative for cough, shortness of breath or wheezing  Negative for chest pain, palpitations, swelling of legs  Negative for abdominal pain, constipation, diarrhea, blood in the stools  Negative for urinary complaints  Negative for headache, dizziness, weakness or numbness  Positive for left hip pain and difficulty in walking.  Negative for depression or anxiety  All other systems reviewed and were negative   Vitals:    10/25/24 1359   BP: 124/72            Physical Exam  Constitutional: Patient does not appear to be in any acute distress  Head and Face: NCAT  Eyes: Normal external exam, EOMI  ENT: Normal external inspection of ears and nose. Oropharynx normal.  Cardiovascular: RRR, S1/S2, no murmurs, rubs, or gallops, radial pulses +2, no edema of extremities  Pulmonary: CTAB, no respiratory distress.  Abdomen: +BS, soft, non-tender, nondistended, no guarding or rebound, no masses noted  MSK: Patient has decreased range of motion of the left hip due to recent surgery.  Mild edema of the left leg..  Skin- No lesions, contusions, or  "erythema.  Peripheral puslses palpable bilaterally 2+  Neuro: AAO X3, Cranial nerves 2-12 grossly intact,DTR 2+ in all 4 limbs   Psychiatric: Judgment intact. Appropriate mood and behavior    LABS   [unfilled]  Lab Results   Component Value Date    GLUCOSE 95 11/04/2020    CALCIUM 9.6 11/04/2020     11/04/2020    K 3.7 11/04/2020    CO2 20 (L) 11/04/2020     11/04/2020    BUN 20 11/04/2020    CREATININE 1.0 11/04/2020     No results found for: \"ALT\", \"AST\", \"GGT\", \"ALKPHOS\", \"BILITOT\"  Lab Results   Component Value Date    WBC 5.7 10/31/2020    HGB 13.3 10/31/2020    HCT 42.0 10/31/2020    MCV 85.7 10/31/2020     10/31/2020     Lab Results   Component Value Date    CHOL 268 (H) 10/31/2020     Lab Results   Component Value Date    HDL 36 (L) 10/31/2020     Lab Results   Component Value Date    LDLCALC 166 (H) 10/31/2020     Lab Results   Component Value Date    TRIG 331 (H) 10/31/2020     Lab Results   Component Value Date    HGBA1C 5.8 (H) 05/13/2024     Other labs not included in the list above were reviewed either before or during this encounter.    History    No past medical history on file.  Past Surgical History:   Procedure Laterality Date    MR HEAD ANGIO WO IV CONTRAST  4/23/2015    MR HEAD ANGIO WO IV CONTRAST [facility] INPATIENT LEGACY    MR HEAD ANGIO WO IV CONTRAST  10/31/2020    MR HEAD ANGIO WO IV CONTRAST [facility]    MR NECK ANGIO WO IV CONTRAST  10/31/2020    MR NECK ANGIO WO IV CONTRAST [facility] EMERGENCY LEGACY     No family history on file.  Not on File  No current outpatient medications on file prior to visit.     No current facility-administered medications on file prior to visit.       There is no immunization history on file for this patient.  Patient's medical history was reviewed and updated either before or during this encounter.  ASSESSMENT / PLAN:  Diagnoses and all orders for this visit:  Status post hip surgery  Iron deficiency anemia, unspecified iron deficiency " anemia type  Acute deep vein thrombosis (DVT) of proximal vein of left lower extremity (Multi)  Difficulty in walking          Patient is being seen for follow up at rehab .  Patient had left hip surgery done and needs physical therapy.  Denies any significant pain.  Patient does have iron deficiency anemia.  Patient is on iron supplementation and anemia is improving.  Patient is also on Eliquis for DVT.  Will continue to monitor.    Aaron Pablo MD

## 2024-11-22 ENCOUNTER — NURSING HOME VISIT (OUTPATIENT)
Dept: POST ACUTE CARE | Facility: EXTERNAL LOCATION | Age: 79
End: 2024-11-22
Payer: MEDICARE

## 2024-11-22 DIAGNOSIS — Z98.890 STATUS POST HIP SURGERY: Primary | ICD-10-CM

## 2024-11-22 DIAGNOSIS — I82.4Y2 ACUTE DEEP VEIN THROMBOSIS (DVT) OF PROXIMAL VEIN OF LEFT LOWER EXTREMITY (MULTI): ICD-10-CM

## 2024-11-22 DIAGNOSIS — N18.31 STAGE 3A CHRONIC KIDNEY DISEASE (MULTI): ICD-10-CM

## 2024-11-22 DIAGNOSIS — R26.2 DIFFICULTY IN WALKING: ICD-10-CM

## 2024-11-22 DIAGNOSIS — D50.9 IRON DEFICIENCY ANEMIA, UNSPECIFIED IRON DEFICIENCY ANEMIA TYPE: ICD-10-CM

## 2024-11-22 DIAGNOSIS — Z96.642 S/P HIP REPLACEMENT, LEFT: ICD-10-CM

## 2024-11-22 PROCEDURE — 99309 SBSQ NF CARE MODERATE MDM 30: CPT | Performed by: INTERNAL MEDICINE

## 2024-11-22 NOTE — LETTER
Patient: Leigh Ann Gray  : 1945    Encounter Date: 2024    [facility]: [facility]  PROGRESS NOTE      Leigh Ann Gray is a 78 y.o. female that is being seen  today for follow up at  rehab   Subjective  Patient is a 78-year-old female with history of iron deficiency anemia, osteoarthritis of the hip, DVT of the lower extremity, chronic kidney disease, chronic low back pain who is being seen for follow up at rehab.  Patient was admitted for surgery for left hip.  Patient had hip replacement done.  Postop patient had DVT in her leg.  Patient has been on anticoagulation.  Patient does have chronic anemia and received iron infusions before surgery.   Patient has been on iron supplementation and lab work is being done.  Anemia is stable.Patient is getting physical therapy.  Pain has been fairly controlled.      ROS  Negative for fever or chills  Negative for sore throat, ear pain, nasal discharge  Negative for cough, shortness of breath or wheezing  Negative for chest pain, palpitations, swelling of legs  Negative for abdominal pain, constipation, diarrhea, blood in the stools  Negative for urinary complaints  Negative for headache, dizziness, weakness or numbness  Positive for left hip pain and difficulty in walking.  Negative for depression or anxiety  All other systems reviewed and were negative   Vitals:    24 1326   BP: 130/70   Pulse: 78   Temp: 37 °C (98.6 °F)                  Physical Exam  Constitutional: Patient does not appear to be in any acute distress  Head and Face: NCAT  Eyes: Normal external exam, EOMI  ENT: Normal external inspection of ears and nose. Oropharynx normal.  Cardiovascular: RRR, S1/S2, no murmurs, rubs, or gallops, radial pulses +2, no edema of extremities  Pulmonary: CTAB, no respiratory distress.  Abdomen: +BS, soft, non-tender, nondistended, no guarding or rebound, no masses noted  MSK: Patient has decreased range of motion of the left hip due to  "recent surgery.  Mild edema of the left leg..  Skin- No lesions, contusions, or erythema.  Peripheral puslses palpable bilaterally 2+  Neuro: AAO X3, Cranial nerves 2-12 grossly intact,DTR 2+ in all 4 limbs   Psychiatric: Judgment intact. Appropriate mood and behavior    LABS   [unfilled]  Lab Results   Component Value Date    GLUCOSE 95 11/04/2020    CALCIUM 9.6 11/04/2020     11/04/2020    K 3.7 11/04/2020    CO2 20 (L) 11/04/2020     11/04/2020    BUN 20 11/04/2020    CREATININE 1.0 11/04/2020     No results found for: \"ALT\", \"AST\", \"GGT\", \"ALKPHOS\", \"BILITOT\"  Lab Results   Component Value Date    WBC 5.7 10/31/2020    HGB 13.3 10/31/2020    HCT 42.0 10/31/2020    MCV 85.7 10/31/2020     10/31/2020     Lab Results   Component Value Date    CHOL 268 (H) 10/31/2020     Lab Results   Component Value Date    HDL 36 (L) 10/31/2020     Lab Results   Component Value Date    LDLCALC 166 (H) 10/31/2020     Lab Results   Component Value Date    TRIG 331 (H) 10/31/2020     Lab Results   Component Value Date    HGBA1C 5.8 (H) 05/13/2024     Other labs not included in the list above were reviewed either before or during this encounter.    History   No past medical history on file.  Past Surgical History:   Procedure Laterality Date   • MR HEAD ANGIO WO IV CONTRAST  4/23/2015    MR HEAD ANGIO WO IV CONTRAST [facility]   • MR HEAD ANGIO WO IV CONTRAST  10/31/2020    MR HEAD ANGIO WO IV CONTRAST [facility]   • MR NECK ANGIO WO IV CONTRAST  10/31/2020    MR NECK ANGIO WO IV CONTRAST [facility]     No family history on file.  Not on File  No current outpatient medications on file prior to visit.     No current facility-administered medications on file prior to visit.       There is no immunization history on file for this patient.  Patient's medical history was reviewed and updated either before or during this encounter.  ASSESSMENT / PLAN:  Diagnoses and all orders for this " visit:  Status post hip surgery  Acute deep vein thrombosis (DVT) of proximal vein of left lower extremity (Multi)  Difficulty in walking  Iron deficiency anemia, unspecified iron deficiency anemia type  S/P hip replacement, left  Stage 3a chronic kidney disease (Multi)                Patient is being seen for follow up at rehab .  Patient had left hip surgery done and needs physical therapy.  Denies any significant pain.  Patient does have iron deficiency anemia.  Patient is on iron supplementation and anemia is improving.  Patient is also on Eliquis for DVT.  Will continue to monitor.    Aaron Pablo MD       Electronically Signed By: Aaron Pablo MD   11/30/24  7:28 PM

## 2024-11-27 ENCOUNTER — NURSING HOME VISIT (OUTPATIENT)
Dept: POST ACUTE CARE | Facility: EXTERNAL LOCATION | Age: 79
End: 2024-11-27
Payer: MEDICARE

## 2024-11-27 DIAGNOSIS — Z98.890 STATUS POST HIP SURGERY: Primary | ICD-10-CM

## 2024-11-27 DIAGNOSIS — R26.2 DIFFICULTY IN WALKING: ICD-10-CM

## 2024-11-27 DIAGNOSIS — Z96.642 S/P HIP REPLACEMENT, LEFT: ICD-10-CM

## 2024-11-27 DIAGNOSIS — I82.4Y2 ACUTE DEEP VEIN THROMBOSIS (DVT) OF PROXIMAL VEIN OF LEFT LOWER EXTREMITY (MULTI): ICD-10-CM

## 2024-11-27 DIAGNOSIS — D50.9 IRON DEFICIENCY ANEMIA, UNSPECIFIED IRON DEFICIENCY ANEMIA TYPE: Chronic | ICD-10-CM

## 2024-11-27 PROCEDURE — 99315 NF DSCHRG MGMT 30 MIN/LESS: CPT | Performed by: INTERNAL MEDICINE

## 2024-11-27 NOTE — LETTER
Patient: Leigh Ann Gray  : 1945    Encounter Date: 2024    [facility]: [facility]  PROGRESS NOTE      Leigh Ann Gray is a 78 y.o. female that is being seen  today for follow up at  rehab   Subjective  Patient is a 78-year-old female with history of iron deficiency anemia, osteoarthritis of the hip, DVT of the lower extremity, chronic kidney disease, chronic low back pain who is being seen for follow up at rehab.  Patient was admitted for surgery for left hip.  Patient had hip replacement done.  Postop patient had DVT in her leg.  Patient has been on anticoagulation.  Patient does have chronic anemia and received iron infusions before surgery.   Patient has been on iron supplementation and anemia has improved.  Patient is being discharged home.  Patient will follow-up with the primary care physician.      ROS  Negative for fever or chills  Negative for sore throat, ear pain, nasal discharge  Negative for cough, shortness of breath or wheezing  Negative for chest pain, palpitations, swelling of legs  Negative for abdominal pain, constipation, diarrhea, blood in the stools  Negative for urinary complaints  Negative for headache, dizziness, weakness or numbness  Positive for left hip pain and difficulty in walking.  Negative for depression or anxiety  All other systems reviewed and were negative       Vital signs are stable.  Medications reviewed in the facility chart             Physical Exam  Constitutional: Patient does not appear to be in any acute distress  Head and Face: NCAT  Eyes: Normal external exam, EOMI  ENT: Normal external inspection of ears and nose. Oropharynx normal.  Cardiovascular: RRR, S1/S2, no murmurs, rubs, or gallops, radial pulses +2, no edema of extremities  Pulmonary: CTAB, no respiratory distress.  Abdomen: +BS, soft, non-tender, nondistended, no guarding or rebound, no masses noted  MSK: Patient has decreased range of motion of the left hip due to recent  "surgery.  Mild edema of the left leg..  Skin- No lesions, contusions, or erythema.  Peripheral puslses palpable bilaterally 2+  Neuro: AAO X3, Cranial nerves 2-12 grossly intact,DTR 2+ in all 4 limbs   Psychiatric: Judgment intact. Appropriate mood and behavior    LABS   [unfilled]  Lab Results   Component Value Date    GLUCOSE 95 11/04/2020    CALCIUM 9.6 11/04/2020     11/04/2020    K 3.7 11/04/2020    CO2 20 (L) 11/04/2020     11/04/2020    BUN 20 11/04/2020    CREATININE 1.0 11/04/2020     No results found for: \"ALT\", \"AST\", \"GGT\", \"ALKPHOS\", \"BILITOT\"  Lab Results   Component Value Date    WBC 5.7 10/31/2020    HGB 13.3 10/31/2020    HCT 42.0 10/31/2020    MCV 85.7 10/31/2020     10/31/2020     Lab Results   Component Value Date    CHOL 268 (H) 10/31/2020     Lab Results   Component Value Date    HDL 36 (L) 10/31/2020     Lab Results   Component Value Date    LDLCALC 166 (H) 10/31/2020     Lab Results   Component Value Date    TRIG 331 (H) 10/31/2020     Lab Results   Component Value Date    HGBA1C 5.8 (H) 05/13/2024     Other labs not included in the list above were reviewed either before or during this encounter.    History   No past medical history on file.  Past Surgical History:   Procedure Laterality Date   • MR HEAD ANGIO WO IV CONTRAST  4/23/2015    MR HEAD ANGIO WO IV CONTRAST [facility] INPATIENT [facility]GAC[facility]   • MR HEAD ANGIO WO IV CONTRAST  10/31/2020    MR HEAD ANGIO WO IV CONTRAST [facility] EMERGENCY LEGACY   • MR NECK ANGIO WO IV CONTRAST  10/31/2020    MR NECK ANGIO WO IV CONTRAST [facility]     No family history on file.  Not on File  No current outpatient medications on file prior to visit.     No current facility-administered medications on file prior to visit.       There is no immunization history on file for this patient.  Patient's medical history was reviewed and updated either before or during this encounter.  ASSESSMENT / PLAN:  Diagnoses and all orders for this " visit:  Status post hip surgery  Acute deep vein thrombosis (DVT) of proximal vein of left lower extremity (Multi)  Difficulty in walking  Iron deficiency anemia, unspecified iron deficiency anemia type  S/P hip replacement, left  Patient is being seen for follow-up.  Pain is fairly controlled anemia has been better.  Patient has been able to walk with a walker.  Patient is being discharged home patient will follow-up with the primary care physician          Aaron Pablo MD       Electronically Signed By: Aaron Pablo MD   11/30/24  7:43 PM

## 2024-11-30 VITALS — DIASTOLIC BLOOD PRESSURE: 70 MMHG | SYSTOLIC BLOOD PRESSURE: 130 MMHG | HEART RATE: 78 BPM | TEMPERATURE: 98.6 F

## 2024-12-01 NOTE — PROGRESS NOTES
[facility]: [facility]  PROGRESS NOTE      Leigh Ann Gray is a 78 y.o. female that is being seen  today for follow up at  rehab   Subjective   Patient is a 78-year-old female with history of iron deficiency anemia, osteoarthritis of the hip, DVT of the lower extremity, chronic kidney disease, chronic low back pain who is being seen for follow up at rehab.  Patient was admitted for surgery for left hip.  Patient had hip replacement done.  Postop patient had DVT in her leg.  Patient has been on anticoagulation.  Patient does have chronic anemia and received iron infusions before surgery.   Patient has been on iron supplementation and anemia has improved.  Patient is being discharged home.  Patient will follow-up with the primary care physician.      ROS  Negative for fever or chills  Negative for sore throat, ear pain, nasal discharge  Negative for cough, shortness of breath or wheezing  Negative for chest pain, palpitations, swelling of legs  Negative for abdominal pain, constipation, diarrhea, blood in the stools  Negative for urinary complaints  Negative for headache, dizziness, weakness or numbness  Positive for left hip pain and difficulty in walking.  Negative for depression or anxiety  All other systems reviewed and were negative       Vital signs are stable.  Medications reviewed in the facility chart             Physical Exam  Constitutional: Patient does not appear to be in any acute distress  Head and Face: NCAT  Eyes: Normal external exam, EOMI  ENT: Normal external inspection of ears and nose. Oropharynx normal.  Cardiovascular: RRR, S1/S2, no murmurs, rubs, or gallops, radial pulses +2, no edema of extremities  Pulmonary: CTAB, no respiratory distress.  Abdomen: +BS, soft, non-tender, nondistended, no guarding or rebound, no masses noted  MSK: Patient has decreased range of motion of the left hip due to recent surgery.  Mild edema of the left leg..  Skin- No lesions, contusions, or  "erythema.  Peripheral puslses palpable bilaterally 2+  Neuro: AAO X3, Cranial nerves 2-12 grossly intact,DTR 2+ in all 4 limbs   Psychiatric: Judgment intact. Appropriate mood and behavior    LABS   [unfilled]  Lab Results   Component Value Date    GLUCOSE 95 11/04/2020    CALCIUM 9.6 11/04/2020     11/04/2020    K 3.7 11/04/2020    CO2 20 (L) 11/04/2020     11/04/2020    BUN 20 11/04/2020    CREATININE 1.0 11/04/2020     No results found for: \"ALT\", \"AST\", \"GGT\", \"ALKPHOS\", \"BILITOT\"  Lab Results   Component Value Date    WBC 5.7 10/31/2020    HGB 13.3 10/31/2020    HCT 42.0 10/31/2020    MCV 85.7 10/31/2020     10/31/2020     Lab Results   Component Value Date    CHOL 268 (H) 10/31/2020     Lab Results   Component Value Date    HDL 36 (L) 10/31/2020     Lab Results   Component Value Date    LDLCALC 166 (H) 10/31/2020     Lab Results   Component Value Date    TRIG 331 (H) 10/31/2020     Lab Results   Component Value Date    HGBA1C 5.8 (H) 05/13/2024     Other labs not included in the list above were reviewed either before or during this encounter.    History    No past medical history on file.  Past Surgical History:   Procedure Laterality Date    MR HEAD ANGIO WO IV CONTRAST  4/23/2015    MR HEAD ANGIO WO IV CONTRAST [facility]PATIENT [facility]GAC[facility]    MR HEAD ANGIO WO IV CONTRAST  10/31/2020    MR HEAD ANGIO WO IV CONTRAST [facility]    MR NECK ANGIO WO IV CONTRAST  10/31/2020    MR NECK ANGIO WO IV CONTRAST [facility]ERGENCY LEGAC[facility]     No family history on file.  Not on File  No current outpatient medications on file prior to visit.     No current facility-administered medications on file prior to visit.       There is no immunization history on file for this patient.  Patient's medical history was reviewed and updated either before or during this encounter.  ASSESSMENT / PLAN:  Diagnoses and all orders for this visit:  Status post hip surgery  Acute deep vein thrombosis (DVT) of proximal vein of " left lower extremity (Multi)  Difficulty in walking  Iron deficiency anemia, unspecified iron deficiency anemia type  S/P hip replacement, left  Patient is being seen for follow-up.  Pain is fairly controlled anemia has been better.  Patient has been able to walk with a walker.  Patient is being discharged home patient will follow-up with the primary care physician          Aaron Pablo MD

## 2024-12-01 NOTE — PROGRESS NOTES
[facility]: [facility]  PROGRESS NOTE      Leigh Ann Gray is a 78 y.o. female that is being seen  today for follow up at  rehab   Subjective   Patient is a 78-year-old female with history of iron deficiency anemia, osteoarthritis of the hip, DVT of the lower extremity, chronic kidney disease, chronic low back pain who is being seen for follow up at rehab.  Patient was admitted for surgery for left hip.  Patient had hip replacement done.  Postop patient had DVT in her leg.  Patient has been on anticoagulation.  Patient does have chronic anemia and received iron infusions before surgery.   Patient has been on iron supplementation and lab work is being done.  Anemia is stable.Patient is getting physical therapy.  Pain has been fairly controlled.      ROS  Negative for fever or chills  Negative for sore throat, ear pain, nasal discharge  Negative for cough, shortness of breath or wheezing  Negative for chest pain, palpitations, swelling of legs  Negative for abdominal pain, constipation, diarrhea, blood in the stools  Negative for urinary complaints  Negative for headache, dizziness, weakness or numbness  Positive for left hip pain and difficulty in walking.  Negative for depression or anxiety  All other systems reviewed and were negative   Vitals:    11/22/24 1326   BP: 130/70   Pulse: 78   Temp: 37 °C (98.6 °F)                  Physical Exam  Constitutional: Patient does not appear to be in any acute distress  Head and Face: NCAT  Eyes: Normal external exam, EOMI  ENT: Normal external inspection of ears and nose. Oropharynx normal.  Cardiovascular: RRR, S1/S2, no murmurs, rubs, or gallops, radial pulses +2, no edema of extremities  Pulmonary: CTAB, no respiratory distress.  Abdomen: +BS, soft, non-tender, nondistended, no guarding or rebound, no masses noted  MSK: Patient has decreased range of motion of the left hip due to recent surgery.  Mild edema of the left leg..  Skin- No lesions,  "contusions, or erythema.  Peripheral puslses palpable bilaterally 2+  Neuro: AAO X3, Cranial nerves 2-12 grossly intact,DTR 2+ in all 4 limbs   Psychiatric: Judgment intact. Appropriate mood and behavior    LABS   [unfilled]  Lab Results   Component Value Date    GLUCOSE 95 11/04/2020    CALCIUM 9.6 11/04/2020     11/04/2020    K 3.7 11/04/2020    CO2 20 (L) 11/04/2020     11/04/2020    BUN 20 11/04/2020    CREATININE 1.0 11/04/2020     No results found for: \"ALT\", \"AST\", \"GGT\", \"ALKPHOS\", \"BILITOT\"  Lab Results   Component Value Date    WBC 5.7 10/31/2020    HGB 13.3 10/31/2020    HCT 42.0 10/31/2020    MCV 85.7 10/31/2020     10/31/2020     Lab Results   Component Value Date    CHOL 268 (H) 10/31/2020     Lab Results   Component Value Date    HDL 36 (L) 10/31/2020     Lab Results   Component Value Date    LDLCALC 166 (H) 10/31/2020     Lab Results   Component Value Date    TRIG 331 (H) 10/31/2020     Lab Results   Component Value Date    HGBA1C 5.8 (H) 05/13/2024     Other labs not included in the list above were reviewed either before or during this encounter.    History    No past medical history on file.  Past Surgical History:   Procedure Laterality Date    MR HEAD ANGIO WO IV CONTRAST  4/23/2015    MR HEAD ANGIO WO IV CONTRAST [facility]    MR HEAD ANGIO WO IV CONTRAST  10/31/2020    MR HEAD ANGIO WO IV CONTRAST [facility]    MR NECK ANGIO WO IV CONTRAST  10/31/2020    MR NECK ANGIO WO IV CONTRAST [facility]     No family history on file.  Not on File  No current outpatient medications on file prior to visit.     No current facility-administered medications on file prior to visit.       There is no immunization history on file for this patient.  Patient's medical history was reviewed and updated either before or during this encounter.  ASSESSMENT / PLAN:  Diagnoses and all orders for this visit:  Status post hip surgery  Acute deep vein thrombosis (DVT) of " proximal vein of left lower extremity (Multi)  Difficulty in walking  Iron deficiency anemia, unspecified iron deficiency anemia type  S/P hip replacement, left  Stage 3a chronic kidney disease (Multi)                Patient is being seen for follow up at rehab .  Patient had left hip surgery done and needs physical therapy.  Denies any significant pain.  Patient does have iron deficiency anemia.  Patient is on iron supplementation and anemia is improving.  Patient is also on Eliquis for DVT.  Will continue to monitor.    Aaron Pablo MD